# Patient Record
Sex: MALE | Race: WHITE | NOT HISPANIC OR LATINO | Employment: FULL TIME | RURAL
[De-identification: names, ages, dates, MRNs, and addresses within clinical notes are randomized per-mention and may not be internally consistent; named-entity substitution may affect disease eponyms.]

---

## 2024-04-23 ENCOUNTER — OFFICE VISIT (OUTPATIENT)
Dept: FAMILY MEDICINE CLINIC | Facility: CLINIC | Age: 24
End: 2024-04-23
Payer: COMMERCIAL

## 2024-04-23 ENCOUNTER — PATIENT ROUNDING (BHMG ONLY) (OUTPATIENT)
Dept: FAMILY MEDICINE CLINIC | Facility: CLINIC | Age: 24
End: 2024-04-23
Payer: COMMERCIAL

## 2024-04-23 VITALS
HEART RATE: 94 BPM | HEIGHT: 69 IN | DIASTOLIC BLOOD PRESSURE: 84 MMHG | WEIGHT: 170.6 LBS | OXYGEN SATURATION: 98 % | TEMPERATURE: 97.9 F | RESPIRATION RATE: 18 BRPM | BODY MASS INDEX: 25.27 KG/M2 | SYSTOLIC BLOOD PRESSURE: 136 MMHG

## 2024-04-23 DIAGNOSIS — N50.89 ENLARGED TESTICLE: ICD-10-CM

## 2024-04-23 DIAGNOSIS — Z76.89 ENCOUNTER TO ESTABLISH CARE: Primary | ICD-10-CM

## 2024-04-23 DIAGNOSIS — Z72.0 TOBACCO ABUSE: ICD-10-CM

## 2024-04-23 DIAGNOSIS — Z11.59 ENCOUNTER FOR HEPATITIS C SCREENING TEST FOR LOW RISK PATIENT: ICD-10-CM

## 2024-04-23 DIAGNOSIS — Z71.1 WORRIED WELL: ICD-10-CM

## 2024-04-23 DIAGNOSIS — Z13.29 SCREENING FOR THYROID DISORDER: ICD-10-CM

## 2024-04-23 DIAGNOSIS — R03.0 ELEVATED BLOOD PRESSURE READING WITHOUT DIAGNOSIS OF HYPERTENSION: ICD-10-CM

## 2024-04-23 DIAGNOSIS — Z23 NEED FOR TETANUS BOOSTER: ICD-10-CM

## 2024-04-23 DIAGNOSIS — Z13.220 SCREENING FOR LIPID DISORDERS: ICD-10-CM

## 2024-04-23 LAB
BILIRUB BLD-MCNC: NEGATIVE MG/DL
CLARITY, POC: CLEAR
COLOR UR: YELLOW
GLUCOSE UR STRIP-MCNC: NEGATIVE MG/DL
KETONES UR QL: NEGATIVE
LEUKOCYTE EST, POC: NEGATIVE
NITRITE UR-MCNC: NEGATIVE MG/ML
PH UR: 7 [PH] (ref 5–8)
PROT UR STRIP-MCNC: NEGATIVE MG/DL
RBC # UR STRIP: NEGATIVE /UL
SP GR UR: 1.02 (ref 1–1.03)
UROBILINOGEN UR QL: NORMAL

## 2024-04-23 PROCEDURE — 99204 OFFICE O/P NEW MOD 45 MIN: CPT

## 2024-04-23 PROCEDURE — 81003 URINALYSIS AUTO W/O SCOPE: CPT

## 2024-04-23 NOTE — PROGRESS NOTES
Office Note     Name: Karl Crocker    : 2000     MRN: [id number]     Chief Complaint  Groin Swelling (Swollen for 8 months ) and Establish Care (Was a patient of Dr. Banda )    Subjective     History of Present Illness:  Karl Crocker is a 24 y.o. male who presents today for establish care and reports a concern with his testicle.  a  History of Present Illness    The patient is here for establishment of care, management of conditions below, medication refills and preventive care.  He was previously cared for by Dr. Banda.    History of syncope resolved.     Testicle Abnormality -   Karl reports a chronic complaint of left scrotal enlargement that has been present for about six to eight months without provocation. He does not acknowledge any certain injury, heavy lifting, exertion, STI exposure or other abnormality. He denies any pain or abnormal function of his scrotum/penis including sexual dysfunction, dysuria or other concern.     Comprehensive Care:   Dental  - 10/ 2023 [facility].   Ophthalmology - [facility], 2022.   Specialty care none    Healthy Habits:  Self Skin Exam: monthly  Overall has :Nutrition: appropriate balanced diet.   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   He wears his seatlbelt regularly.   He sleeps approximately eight hours per night. He does not have sleep apnea.    Recent Hospitalizations:  No hospitalization(s) within the last year..    Age-appropriate Screening Schedule:  Refer to the list below for future screening recommendations based on patient's age, sex and/or medical conditions. Orders for these recommended tests are listed in the plan section. The patient has been provided with a written plan.      Last tetanus shot was 2014  TDAP/TD VACCINES(1 - Tdap) Never  COVID-19 Vaccine( season) Never done  HEPATITIS C SCREENING Never done  ANNUAL PHYSICAL Never done  INFLUENZA VACCINE due on  08/01/2024    Discussion regarding plan of care for medical diagnoses listed below has been completed.   Medical history has been reviewed.  Anticipatory guidance given and routine screenings recommended.  Genetic screening for cancers and conditions discussed if applicable.  Patient agrees with plan and will follow advice.     No Known Allergies    No current outpatient medications on file.    History reviewed. No pertinent past medical history.    Review of Systems   Eyes:  Positive for visual disturbance (glasses).   Genitourinary:  Positive for scrotal swelling (Left enlarged. painless). Negative for difficulty urinating, discharge, dysuria, frequency, genital sores, hematuria, nocturia, penile pain, erectile dysfunction, penile swelling, testicular pain, urgency and urinary incontinence.   All other systems reviewed and are negative.      Social History     Socioeconomic History    Marital status: Single   Tobacco Use    Smoking status: Every Day     Types: Cigars     Start date: 2024    Smokeless tobacco: Never   Vaping Use    Vaping status: Never Used   Substance and Sexual Activity    Alcohol use: Yes     Alcohol/week: 10.0 standard drinks of alcohol     Types: 10 Cans of beer per week    Drug use: Never    Sexual activity: Defer       Family History   Problem Relation Age of Onset    Ovarian cancer Mother     Hypertension Father     Stroke Father     Heart attack Father     Breast cancer Maternal Aunt     Breast cancer Maternal Aunt     Hypertension Maternal Grandmother     Hypertension Maternal Grandfather     Heart attack Paternal Grandfather            4/23/2024     9:12 AM   PHQ-2/PHQ-9 Depression Screening   Little Interest or Pleasure in Doing Things 0-->not at all   Feeling Down, Depressed or Hopeless 0-->not at all   PHQ-9: Brief Depression Severity Measure Score 0       Fall Risk Screen:  MARISELA Fall Risk Assessment has not been completed.      Objective     /84 (BP Location: Left arm,  "Patient Position: Sitting, Cuff Size: Adult)   Pulse 94   Temp 97.9 °F (36.6 °C)   Resp 18   Ht 175 cm (68.9\")   Wt 77.4 kg (170 lb 9.6 oz)   SpO2 98%   BMI 25.27 kg/m²     BP Readings from Last 2 Encounters:   04/23/24 136/84       Wt Readings from Last 2 Encounters:   04/23/24 77.4 kg (170 lb 9.6 oz)       BMI is >= 25 and <30. (Overweight) The following options were offered after discussion;: exercise counseling/recommendations, nutrition counseling/recommendations, and information on healthy weight added to patient's after visit summary          Physical Exam  Vitals and nursing note reviewed.   Constitutional:       General: He is not in acute distress.     Appearance: Normal appearance. He is well-groomed. He is not ill-appearing.   HENT:      Head: Normocephalic and atraumatic.   Eyes:      General: Lids are normal. Vision grossly intact.   Neck:      Vascular: No carotid bruit.   Cardiovascular:      Rate and Rhythm: Normal rate and regular rhythm.      Heart sounds: Normal heart sounds.   Pulmonary:      Effort: Pulmonary effort is normal.      Breath sounds: Normal breath sounds and air entry.   Abdominal:      General: Abdomen is flat.      Palpations: Abdomen is soft.      Hernia: There is no hernia in the left inguinal area or right inguinal area.   Genitourinary:     Testes: Cremasteric reflex is present.         Right: Mass not present.         Left: Swelling present. Mass or tenderness not present.          Comments: Enlarged testicle.   Musculoskeletal:      Right lower leg: No edema.      Left lower leg: No edema.   Skin:     General: Skin is warm and dry.   Neurological:      Mental Status: He is alert and oriented to person, place, and time. Mental status is at baseline.   Psychiatric:         Mood and Affect: Mood normal.         Behavior: Behavior normal. Behavior is cooperative.       Physical Exam   Pelvic   Pelvic comments: Enlarged testicle.       Result Review     The following data " was reviewed by: JOHN Roche on 04/23/2024:            Assessment and Plan     Procedures  Plan   Diagnoses and all orders for this visit:    1. Encounter to establish care (Primary)    2. Worried well  -     CBC & Differential  -     Comprehensive Metabolic Panel  -     POC Urinalysis Dipstick, Multipro    3. Tobacco abuse  Comments:  Cigar use began 2024, about one per week.    4. Enlarged testicle  Comments:  Eight months after lifting heavy box.  No recent intercourse/STI  No obvious injury  Increase in size as day progresses  Not painful.  Orders:  -     US Scrotum & Testicles  -     Ambulatory Referral to Urology    5. Need for tetanus booster    6. Screening for thyroid disorder  -     TSH    7. Encounter for hepatitis C screening test for low risk patient  -     Hepatitis C Antibody    8. Screening for lipid disorders  -     Lipid Panel With / Chol / HDL Ratio    9. Elevated blood pressure reading without diagnosis of hypertension    Other orders  -     Tdap Vaccine => 6yo IM (BOOSTRIX)        Problem List Items Addressed This Visit    None  Visit Diagnoses       Encounter to establish care    -  Primary    Worried well        Relevant Orders    CBC & Differential    Comprehensive Metabolic Panel    POC Urinalysis Dipstick, Multipro (Completed)    Tobacco abuse        Cigar use began 2024, about one per week.    Enlarged testicle        Eight months after lifting heavy box.  No recent intercourse/STI  No obvious injury  Increase in size as day progresses  Not painful.    Relevant Orders    US Scrotum & Testicles    Ambulatory Referral to Urology (Completed)    Need for tetanus booster        Screening for thyroid disorder        Relevant Orders    TSH    Encounter for hepatitis C screening test for low risk patient        Relevant Orders    Hepatitis C Antibody    Screening for lipid disorders        Relevant Orders    Lipid Panel With / Chol / HDL Ratio    Elevated blood pressure reading without  diagnosis of hypertension                 Follow Up   Wrapup Tab  Return for Annual physical.   Patient Instructions   Have blood work completed.     Ultrasound of testicle.        Patient was given instructions and counseling regarding his condition or for health maintenance advice. Please see specific information pulled into the AVS if appropriate.  Hand hygiene was performed during entrance to exam room and following assessment of patient. This document is intended for medical expert use only.     EMR Dragon/Transcription disclaimer:   Much of this encounter note is an electronic transcription/translation of spoken language to printed text. The electronic translation of spoken language may permit erroneous, or at times, nonsensical words or phrases to be inadvertently transcribed.      JOHN Roche, FNP-C  [address]  [facility]  [address] [place] [address] [address]  [place] [address] [phone]

## 2024-04-23 NOTE — PROGRESS NOTES
April 23, 2024    Hello, may I speak with Karl Crocker?    My name is KARAN      I am  with [address]  [facility]  [address] [place] [address] [address]  [place] [address] [phone].    Before we get started may I verify your date of birth? 2000    I am calling to officially welcome you to our practice and ask about your recent visit. Is this a good time to talk? yes    Tell me about your visit with us. What things went well?  WAS GOOD       We're always looking for ways to make our patients' experiences even better. Do you have recommendations on ways we may improve?  no    Overall were you satisfied with your first visit to our practice? yes       I appreciate you taking the time to speak with me today. Is there anything else I can do for you? no      Thank you, and have a great day.

## 2024-04-29 ENCOUNTER — HOSPITAL ENCOUNTER (OUTPATIENT)
Dept: ULTRASOUND IMAGING | Facility: HOSPITAL | Age: 24
Discharge: HOME | End: 2024-04-29
Payer: COMMERCIAL

## 2024-04-29 PROCEDURE — 76870 US EXAM SCROTUM: CPT

## 2024-04-29 PROCEDURE — 93976 VASCULAR STUDY: CPT

## 2024-04-30 LAB
ALBUMIN SERPL-MCNC: 5.1 G/DL (ref 4.3–5.2)
ALBUMIN/GLOB SERPL: 1.8 {RATIO} (ref 1.2–2.2)
ALP SERPL-CCNC: 80 IU/L (ref 44–121)
ALT SERPL-CCNC: 32 IU/L (ref 0–44)
AST SERPL-CCNC: 25 IU/L (ref 0–40)
BASOPHILS # BLD AUTO: 0.1 X10E3/UL (ref 0–0.2)
BASOPHILS NFR BLD AUTO: 1 %
BILIRUB SERPL-MCNC: 0.6 MG/DL (ref 0–1.2)
BUN SERPL-MCNC: 6 MG/DL (ref 6–20)
BUN/CREAT SERPL: 6 (ref 9–20)
CALCIUM SERPL-MCNC: 10.4 MG/DL (ref 8.7–10.2)
CHLORIDE SERPL-SCNC: 100 MMOL/L (ref 96–106)
CHOLEST SERPL-MCNC: 213 MG/DL (ref 100–199)
CHOLEST/HDLC SERPL: 3.3 RATIO (ref 0–5)
CO2 SERPL-SCNC: 25 MMOL/L (ref 20–29)
CREAT SERPL-MCNC: 0.94 MG/DL (ref 0.76–1.27)
EGFRCR SERPLBLD CKD-EPI 2021: 118 ML/MIN/1.73
EOSINOPHIL # BLD AUTO: 0.1 X10E3/UL (ref 0–0.4)
EOSINOPHIL NFR BLD AUTO: 1 %
ERYTHROCYTE [DISTWIDTH] IN BLOOD BY AUTOMATED COUNT: 12.5 % (ref 11.6–15.4)
GLOBULIN SER CALC-MCNC: 2.9 G/DL (ref 1.5–4.5)
GLUCOSE SERPL-MCNC: 99 MG/DL (ref 70–99)
HCT VFR BLD AUTO: 46.9 % (ref 37.5–51)
HCV IGG SERPL QL IA: NON REACTIVE
HDLC SERPL-MCNC: 64 MG/DL
HGB BLD-MCNC: 16 G/DL (ref 13–17.7)
IMM GRANULOCYTES # BLD AUTO: 0 X10E3/UL (ref 0–0.1)
IMM GRANULOCYTES NFR BLD AUTO: 0 %
LDLC SERPL CALC-MCNC: 136 MG/DL (ref 0–99)
LYMPHOCYTES # BLD AUTO: 2 X10E3/UL (ref 0.7–3.1)
LYMPHOCYTES NFR BLD AUTO: 37 %
MCH RBC QN AUTO: 28.7 PG (ref 26.6–33)
MCHC RBC AUTO-ENTMCNC: 34.1 G/DL (ref 31.5–35.7)
MCV RBC AUTO: 84 FL (ref 79–97)
MONOCYTES # BLD AUTO: 0.5 X10E3/UL (ref 0.1–0.9)
MONOCYTES NFR BLD AUTO: 9 %
NEUTROPHILS # BLD AUTO: 2.9 X10E3/UL (ref 1.4–7)
NEUTROPHILS NFR BLD AUTO: 52 %
PLATELET # BLD AUTO: 285 X10E3/UL (ref 150–450)
POTASSIUM SERPL-SCNC: 4.8 MMOL/L (ref 3.5–5.2)
PROT SERPL-MCNC: 8 G/DL (ref 6–8.5)
RBC # BLD AUTO: 5.58 X10E6/UL (ref 4.14–5.8)
SODIUM SERPL-SCNC: 141 MMOL/L (ref 134–144)
TRIGL SERPL-MCNC: 73 MG/DL (ref 0–149)
TSH SERPL DL<=0.005 MIU/L-ACNC: 1.89 UIU/ML (ref 0.45–4.5)
VLDLC SERPL CALC-MCNC: 13 MG/DL (ref 5–40)
WBC # BLD AUTO: 5.5 X10E3/UL (ref 3.4–10.8)

## 2024-05-05 RX ORDER — SULFAMETHOXAZOLE AND TRIMETHOPRIM 800; 160 MG/1; MG/1
1 TABLET ORAL 2 TIMES DAILY
Qty: 20 TABLET | Refills: 0 | Status: SHIPPED | OUTPATIENT
Start: 2024-05-05

## 2024-05-06 ENCOUNTER — TELEPHONE (OUTPATIENT)
Dept: FAMILY MEDICINE CLINIC | Facility: CLINIC | Age: 24
End: 2024-05-06
Payer: COMMERCIAL

## 2024-05-06 NOTE — TELEPHONE ENCOUNTER
Caller: Karl Crocker    Relationship: Self    Best call back number: [phone]        What test was performed: LABS AND  ULTRA SOUND    When was the test performed: 4/29/24-4/29/24    Where was the test performed:     Additional notes:

## 2024-05-13 ENCOUNTER — OFFICE VISIT (OUTPATIENT)
Dept: FAMILY MEDICINE CLINIC | Facility: CLINIC | Age: 24
End: 2024-05-13
Payer: COMMERCIAL

## 2024-05-13 VITALS
RESPIRATION RATE: 18 BRPM | HEIGHT: 69 IN | OXYGEN SATURATION: 97 % | HEART RATE: 65 BPM | DIASTOLIC BLOOD PRESSURE: 76 MMHG | WEIGHT: 172.2 LBS | SYSTOLIC BLOOD PRESSURE: 122 MMHG | BODY MASS INDEX: 25.51 KG/M2 | TEMPERATURE: 97.6 F

## 2024-05-13 DIAGNOSIS — Z82.3 FAMILY HISTORY OF STROKE (CEREBROVASCULAR): ICD-10-CM

## 2024-05-13 DIAGNOSIS — Z82.49 FAMILY HISTORY OF HYPERTENSION: ICD-10-CM

## 2024-05-13 DIAGNOSIS — E78.00 ELEVATED LDL CHOLESTEROL LEVEL: ICD-10-CM

## 2024-05-13 DIAGNOSIS — Z00.00 HEALTH MAINTENANCE EXAMINATION: Primary | ICD-10-CM

## 2024-05-13 PROCEDURE — 99395 PREV VISIT EST AGE 18-39: CPT

## 2024-05-13 NOTE — ASSESSMENT & PLAN NOTE
Discussed injury prevention, diet and exercise, safe sexual practices, and screening for common diseases. Encouraged use of sunscreen and seatbelts. Avoidance of tobacco encouraged. Limitation or avoidance of alcohol encouraged.      Anticipatory guidance given and routine screenings recommended with recommendations of yearly dental and eye exams., monitoring of blood pressure, lipids, and screening for colon and lung cancer risks.     
11

## 2024-05-13 NOTE — PROGRESS NOTES
Office Note          Name: Karl Crocker    : 2000     MRN: [id number]     Chief Complaint  Annual Exam    Subjective     Karl Crocker is a 24 y.o. male here for his annual wellness and physical exam.    Karl is additionally here for coordination of medical care, to discuss health maintenance, disease prevention as well as to follow-up on medical problems.    HPI    Comprehensive Care:   Dental  - 10/ 2023 [facility].   Ophthalmology - [facility], 2022.   Specialty care none     Healthy Habits:  Self Skin Exam: monthly  Overall has :Nutrition: appropriate balanced diet.   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   He wears his seatlbelt regularly.   He sleeps approximately eight hours per night. He does not have sleep apnea.     Recent Hospitalizations:  No hospitalization(s) within the last year.    Age-appropriate Screening Schedule:  Refer to the list below for future screening recommendations based on patient's age, sex and/or medical conditions. Orders for these recommended tests are listed in the plan section. The patient has been provided with a written plan.      Last tetanus shot was 2014  TDAP/TD VACCINES( - Tdap) Never  COVID-19 Vaccine(2023- season) Never done  HEPATITIS C SCREENING Never done  ANNUAL PHYSICAL Never done  INFLUENZA VACCINE due on 2024      Last Completed Colonoscopy       This patient has no relevant Health Maintenance data.                I personally reviewed and updated the patient's allergies, medications, problem list, and past medical, surgical, social, and family history.       Current Outpatient Medications:     sulfamethoxazole-trimethoprim (BACTRIM DS,SEPTRA DS) 800-160 MG per tablet, Take 1 tablet by mouth 2 (Two) Times a Day., Disp: 20 tablet, Rfl: 0      No Known Allergies    There is no immunization history for the selected administration types on file for this patient.    Review of  "Systems   Eyes:  Positive for visual disturbance (glasses).   Genitourinary:  Positive for scrotal swelling (Left enlarged. painless). Negative for difficulty urinating, discharge, dysuria, frequency, genital sores, hematuria, nocturia, penile pain, erectile dysfunction, penile swelling, testicular pain, urgency and urinary incontinence.   All other systems reviewed and are negative.       History reviewed. No pertinent past medical history.    History reviewed. No pertinent surgical history.    Family History   Problem Relation Age of Onset    Ovarian cancer Mother     Hypertension Father     Stroke Father     Heart attack Father     Breast cancer Maternal Aunt     Breast cancer Maternal Aunt     Hypertension Maternal Grandmother     Hypertension Maternal Grandfather     Heart attack Paternal Grandfather        Social History     Socioeconomic History    Marital status: Single   Tobacco Use    Smoking status: Every Day     Types: Cigars     Start date: 2024    Smokeless tobacco: Never   Vaping Use    Vaping status: Never Used   Substance and Sexual Activity    Alcohol use: Yes     Alcohol/week: 10.0 standard drinks of alcohol     Types: 10 Cans of beer per week    Drug use: Never    Sexual activity: Defer       The ASCVD Risk score (Johnna CRUZ, et al., 2019) failed to calculate for the following reasons:    The 2019 ASCVD risk score is only valid for ages 40 to 79    PHQ-2 Depression Screening      4/23/2024     9:12 AM   PHQ-2/PHQ-9 Depression Screening   Little Interest or Pleasure in Doing Things 0-->not at all   Feeling Down, Depressed or Hopeless 0-->not at all   PHQ-9: Brief Depression Severity Measure Score 0       Fall Risk Screen:  MARISELA Fall Risk Assessment has not been completed.    Objective     Vital Signs:  /76 (BP Location: Left arm, Patient Position: Sitting, Cuff Size: Large Adult)   Pulse 65   Temp 97.6 °F (36.4 °C) (Temporal)   Resp 18   Ht 175 cm (68.9\")   Wt 78.1 kg (172 lb 3.2 oz)  "  SpO2 97%   BMI 25.50 kg/m²     BP Readings from Last 2 Encounters:   05/13/24 122/76   04/23/24 136/84       Wt Readings from Last 2 Encounters:   05/13/24 78.1 kg (172 lb 3.2 oz)   04/23/24 77.4 kg (170 lb 9.6 oz)       BMI is >= 25 and <30. (Overweight) The following options were offered after discussion;: exercise counseling/recommendations and nutrition counseling/recommendations         Physical Exam:  Physical Exam  Vitals and nursing note reviewed.   Constitutional:       General: He is not in acute distress.     Appearance: Normal appearance. He is well-groomed. He is not ill-appearing.   HENT:      Head: Normocephalic and atraumatic.      Jaw: There is normal jaw occlusion.      Right Ear: Hearing, tympanic membrane, ear canal and external ear normal.      Left Ear: Hearing, tympanic membrane, ear canal and external ear normal.      Nose: Nose normal.      Mouth/Throat:      Lips: Pink.      Mouth: Mucous membranes are moist.      Pharynx: Oropharynx is clear. Uvula midline.   Eyes:      General: Lids are normal. Vision grossly intact.      Extraocular Movements: Extraocular movements intact.      Conjunctiva/sclera: Conjunctivae normal.      Pupils: Pupils are equal, round, and reactive to light. Pupils are equal.   Neck:      Thyroid: No thyromegaly or thyroid tenderness.      Vascular: No carotid bruit.   Cardiovascular:      Rate and Rhythm: Normal rate and regular rhythm.      Pulses: Normal pulses.      Heart sounds: Normal heart sounds.   Pulmonary:      Effort: Pulmonary effort is normal.      Breath sounds: Normal breath sounds and air entry.   Abdominal:      General: Abdomen is flat. Bowel sounds are normal.      Palpations: Abdomen is soft. Abdomen is not rigid.      Tenderness: There is no abdominal tenderness. There is no right CVA tenderness or left CVA tenderness.   Musculoskeletal:         General: Normal range of motion.      Cervical back: Full passive range of motion without pain,  normal range of motion and neck supple.      Right lower leg: No edema.      Left lower leg: No edema.   Skin:     General: Skin is warm and dry.      Capillary Refill: Capillary refill takes less than 2 seconds.   Neurological:      General: No focal deficit present.      Mental Status: He is alert and oriented to person, place, and time. Mental status is at baseline.   Psychiatric:         Attention and Perception: Attention and perception normal.         Mood and Affect: Mood and affect normal.         Speech: Speech normal.         Behavior: Behavior normal. Behavior is cooperative.         Thought Content: Thought content normal.       Derm Physical Exam    Results:   Result Review :{ Labs  Result Review  Imaging  Med Tab  Media       Labs:   CMP          4/29/2024    14:55   CMP   Glucose 99    BUN 6    Creatinine 0.94    Sodium 141    Potassium 4.8    Chloride 100    Calcium 10.4    Total Protein 8.0    Albumin 5.1    Globulin 2.9    Total Bilirubin 0.6    Alkaline Phosphatase 80    AST (SGOT) 25    ALT (SGPT) 32    BUN/Creatinine Ratio 6      CBC w/diff          4/29/2024    14:55   CBC w/Diff   WBC 5.5    RBC 5.58    Hemoglobin 16.0    Hematocrit 46.9    MCV 84    MCH 28.7    MCHC 34.1    RDW 12.5    Platelets 285    Neutrophil Rel % 52    Lymphocyte Rel % 37    Monocyte Rel % 9    Eosinophil Rel % 1    Basophil Rel % 1      Lipid Panel          4/29/2024    14:55   Lipid Panel   Total Cholesterol 213    Triglycerides 73    HDL Cholesterol 64    VLDL Cholesterol 13    LDL Cholesterol  136      TSH          4/29/2024    14:55   TSH   TSH 1.890        UA          4/23/2024    11:40   Urinalysis   Ketones, UA Negative    Leukocytes, UA Negative        Imaging:   No valid procedures specified.   US Scrotum & Testicles    Result Date: 4/30/2024  Impression: Impression: 1. Enlargement and heterogeneity of the left epididymal body with slight increased vascularity. The findings are nonspecific but may reflect  changes of epididymitis. Correlate with clinical symptoms. 2. Small right epididymal cyst or spermatoceles, largest measuring 7 mm. 3. Normal sonographic appearance of each testicle. Normal flow is documented within each testicle. Electronically Signed: Ela Lea MD  4/30/2024 12:44 PM EDT  Workstation ID: [id number]     Data reviewed:          Assessment / Plan      Healthcare Maintenance:  Screenings for osteoporosis, prostate cancer, lung cancer, colon cancer discussed.     Last Completed Colonoscopy       This patient has no relevant Health Maintenance data.             Discussed injury prevention, diet and exercise, safe sexual practices, and screening for common diseases.   Encouraged use of sunscreen and seatbelts. Avoidance of tobacco encouraged.   Limitation or avoidance of alcohol encouraged.     Anticipatory guidance given and encouraged:  yearly dental and eye exams, monitoring of blood pressure, lipids, and screening for colon and lung cancer risks.            Problems Addressed this Visit          Cardiac and Vasculature    Elevated LDL cholesterol level       Family History    Family history of hypertension    Family history of stroke (cerebrovascular)       Health Encounters    Health maintenance examination - Primary     Discussed injury prevention, diet and exercise, safe sexual practices, and screening for common diseases. Encouraged use of sunscreen and seatbelts. Avoidance of tobacco encouraged. Limitation or avoidance of alcohol encouraged.      Anticipatory guidance given and routine screenings recommended with recommendations of yearly dental and eye exams., monitoring of blood pressure, lipids, and screening for colon and lung cancer risks.             Diagnoses         Codes Comments    Health maintenance examination    -  Primary ICD-10-CM: Z00.00  ICD-9-CM: V70.0     Elevated LDL cholesterol level     ICD-10-CM: E78.00  ICD-9-CM: 272.0     Family history of stroke (cerebrovascular)      ICD-10-CM: Z82.3  ICD-9-CM: V17.1     Family history of hypertension     ICD-10-CM: Z82.49  ICD-9-CM: V17.49                 Follow Up   Wrapup Tab  Return in about 1 year (around 5/13/2025) for Annual physical.    Recent lab results if available were discussed as well as medications reconciled.    All questions answered patient agrees with plan of care.    He agrees to return for next routine follow up or sooner as needed.     Patient Instructions   Continue current plan of care as discussed.   Take medication as ordered (if applicable).    Practice good sleep hygiene.  Eat a well balanced diet with fresh fruit and vegetables.    Drink at least 8 bottles of water or equivalent per day.     Limit sweetened beverages, sodas, juices.    Bake, boil, broil or grill your food, avoid eating fried foods.   Exercise at least 150 minutes per week.      Patient was given instructions and counseling regarding his condition or for health maintenance advice. Please see specific information pulled into the AVS if appropriate.  Hand hygiene was performed during entrance to exam room and following assessment of patient. This document is intended for medical expert use only.     EMR Dragon/Transcription disclaimer:   Much of this encounter note is an electronic transcription/translation of spoken language to printed text. The electronic translation of spoken language may permit erroneous, or at times, nonsensical words or phrases to be inadvertently transcribed.      JOHN Roche, FNP-C  [address]  [facility]  [address] [place] [address] [address]  [facility] [address] [phone]

## 2025-04-14 ENCOUNTER — APPOINTMENT (OUTPATIENT)
Dept: GENERAL RADIOLOGY | Facility: HOSPITAL | Age: 25
End: 2025-04-14
Payer: COMMERCIAL

## 2025-04-14 ENCOUNTER — HOSPITAL ENCOUNTER (EMERGENCY)
Facility: HOSPITAL | Age: 25
Discharge: HOME | End: 2025-04-14
Attending: EMERGENCY MEDICINE | Admitting: EMERGENCY MEDICINE
Payer: COMMERCIAL

## 2025-04-14 ENCOUNTER — APPOINTMENT (OUTPATIENT)
Dept: ULTRASOUND IMAGING | Facility: HOSPITAL | Age: 25
End: 2025-04-14
Payer: COMMERCIAL

## 2025-04-14 ENCOUNTER — APPOINTMENT (OUTPATIENT)
Dept: CT IMAGING | Facility: HOSPITAL | Age: 25
End: 2025-04-14
Payer: COMMERCIAL

## 2025-04-14 VITALS
SYSTOLIC BLOOD PRESSURE: 102 MMHG | RESPIRATION RATE: 18 BRPM | HEIGHT: 71 IN | OXYGEN SATURATION: 98 % | TEMPERATURE: 98.8 F | WEIGHT: 150.13 LBS | DIASTOLIC BLOOD PRESSURE: 63 MMHG | BODY MASS INDEX: 21.02 KG/M2 | HEART RATE: 58 BPM

## 2025-04-14 DIAGNOSIS — S00.81XA ABRASION OF FACE, INITIAL ENCOUNTER: ICD-10-CM

## 2025-04-14 DIAGNOSIS — S20.212A CHEST WALL CONTUSION, LEFT, INITIAL ENCOUNTER: ICD-10-CM

## 2025-04-14 DIAGNOSIS — S30.22XA CONTUSION OF SCROTUM, INITIAL ENCOUNTER: Primary | ICD-10-CM

## 2025-04-14 PROCEDURE — 76870 US EXAM SCROTUM: CPT

## 2025-04-14 PROCEDURE — 71101 X-RAY EXAM UNILAT RIBS/CHEST: CPT

## 2025-04-14 PROCEDURE — 93976 VASCULAR STUDY: CPT

## 2025-04-14 PROCEDURE — 99284 EMERGENCY DEPT VISIT MOD MDM: CPT

## 2025-04-14 PROCEDURE — 70486 CT MAXILLOFACIAL W/O DYE: CPT

## 2025-04-14 PROCEDURE — 70450 CT HEAD/BRAIN W/O DYE: CPT

## 2025-04-14 NOTE — ED NOTES
Pt c/o testicle pain, headache and body aches after being attacked by a bull yesterday.  Pt sts he head butted him in the testicles then threw.  Pt sts he landed on the ground the bull proceeded to step on his chest and face with his back hoof. Pt denies LOC.  Pt was able to get up on his own and get out of the pin.  Pt sts family was there and witnessed this happening.  Pt has bruising and scabbing to bilateral hands, chest and face.

## 2025-04-14 NOTE — ED PROVIDER NOTES
"Subjective   History of Present Illness  Chief complaint: Groin pain    25-year-old male presents with an injury to his groin.  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  He then fell back and the calf stepped on his face.  He had no loss of consciousness.  He does have an abrasion to the bridge of his nose.  He also reports some mild left-sided rib pain which he states he is not concerned about.  He states it is mostly his groin pain that brought him to the emergency room.    History provided by:  Patient      Review of Systems   Constitutional:  Negative for fever.   HENT:  Negative for congestion.    Respiratory:  Negative for cough and shortness of breath.    Cardiovascular:  Negative for chest pain.   Gastrointestinal:  Negative for abdominal pain and vomiting.   Genitourinary:  Positive for testicular pain.   Neurological:  Negative for weakness and headaches.       No past medical history on file.    No Known Allergies    No past surgical history on file.    Family History   Problem Relation Age of Onset    Ovarian cancer Mother     Hypertension Father     Stroke Father     Heart attack Father     Breast cancer Maternal Aunt     Breast cancer Maternal Aunt     Hypertension Maternal Grandmother     Hypertension Maternal Grandfather     Heart attack Paternal Grandfather        Social History     Socioeconomic History    Marital status: Single   Tobacco Use    Smoking status: Every Day     Types: Cigars     Start date: 2024    Smokeless tobacco: Never   Vaping Use    Vaping status: Never Used   Substance and Sexual Activity    Alcohol use: Yes     Alcohol/week: 10.0 standard drinks of alcohol     Types: 10 Cans of beer per week    Drug use: Never    Sexual activity: Defer       /63   Pulse 58   Temp 98.8 °F (37.1 °C)   Resp 18   Ht 180.3 cm (71\")   Wt 68.1 kg (150 lb 2.1 oz)   SpO2 98%   BMI 20.94 kg/m²       Objective   Physical Exam  Vitals and " nursing note reviewed.   Constitutional:       Appearance: Normal appearance.   HENT:      Head: Normocephalic.      Comments: Mild abrasion over the bridge of the nose.  No obvious deformity.     Mouth/Throat:      Mouth: Mucous membranes are moist.   Cardiovascular:      Rate and Rhythm: Normal rate and regular rhythm.      Heart sounds: Normal heart sounds.   Pulmonary:      Effort: Pulmonary effort is normal. No respiratory distress.      Breath sounds: Normal breath sounds.   Abdominal:      Palpations: Abdomen is soft.      Tenderness: There is no abdominal tenderness.   Genitourinary:     Comments: There is tenderness to the left testicle with some mild swelling.  Right testicle is unremarkable.  Musculoskeletal:      Comments: There is some tenderness to the left lateral ribs with no crepitus or subcutaneous air.  No other bony tenderness.   Skin:     General: Skin is warm and dry.   Neurological:      General: No focal deficit present.      Mental Status: He is alert and oriented to person, place, and time.         Procedures           ED Course      XR Ribs Left With PA Chest  Result Date: 4/14/2025  Impression: No displaced left rib fracture is seen. No acute chest findings. Electronically Signed: Ela Lea MD  4/14/2025 12:02 PM EDT  Workstation ID: [id number]    CT Head Without Contrast  Result Date: 4/14/2025  No acute intracranial findings. CT FACE FINDINGS: No acute displaced facial fracture. Paranasal sinuses and mastoid air cells are clear. Temporomandibular joints are appropriately located. Orbital structures appear unremarkable. CT FACE IMPRESSION: No acute facial fracture. Electronically Signed: Ela Lea MD  4/14/2025 11:54 AM EDT  Workstation ID: [id number]    CT Facial Bones Without Contrast  Result Date: 4/14/2025  No acute intracranial findings. CT FACE FINDINGS: No acute displaced facial fracture. Paranasal sinuses and mastoid air cells are clear. Temporomandibular joints are  appropriately located. Orbital structures appear unremarkable. CT FACE IMPRESSION: No acute facial fracture. Electronically Signed: Ela Lea MD  4/14/2025 11:54 AM EDT  Workstation ID: [id number]    US Scrotum & Testicles  Result Date: 4/14/2025  Impression: 1. Negative for signs of testicular trauma, parenchymal mass or torsion. 2. Large fat-containing lesion on the left which could reflect inguinal canal lipoma or fat-containing hernia. Electronically Signed: Rob Whalen MD  4/14/2025 11:48 AM EDT  Workstation ID: [id number]                                                     Medical Decision Making  Amount and/or Complexity of Data Reviewed  Radiology: ordered.      CT head shows no acute intracranial abnormality.  CT facial bones shows no acute fracture.  Interpretation of the left rib series and PA chest shows no rib fracture and no pneumothorax or effusion.  Scrotal ultrasound shows no signs of testicular trauma.  There is a large fat-containing lesion on the left which could reflect inguinal canal lipoma or fat-containing hernia.  This was present on a previous exam and patient is aware of this.  Patient is well-appearing and is stable for discharge.      Final diagnoses:   Contusion of scrotum, initial encounter   Abrasion of face, initial encounter   Chest wall contusion, left, initial encounter       ED Disposition  ED Disposition       ED Disposition   Discharge    Condition   Stable    Comment   --               Janelle Pillai, APRN  [address] [place] [address] [phone]    Call   As needed         Medication List      No changes were made to your prescriptions during this visit.            Danny Gonzalez MD  04/14/25 [id number]

## 2025-04-14 NOTE — DISCHARGE INSTRUCTIONS
Follow-up with your primary provider.  Return to the emergency room for any new or worsening symptoms or if you have any other questions or concerns.  Take Tylenol and ibuprofen as needed for pain.